# Patient Record
Sex: FEMALE | Race: WHITE | Employment: PART TIME | URBAN - METROPOLITAN AREA
[De-identification: names, ages, dates, MRNs, and addresses within clinical notes are randomized per-mention and may not be internally consistent; named-entity substitution may affect disease eponyms.]

---

## 2021-02-17 LAB
BASOPHILS ABSOLUTE: NORMAL
BASOPHILS RELATIVE PERCENT: 1.3 %
CHOLESTEROL, TOTAL: 186 MG/DL
CHOLESTEROL/HDL RATIO: 2
EOSINOPHILS ABSOLUTE: NORMAL
EOSINOPHILS RELATIVE PERCENT: 1 %
HCT VFR BLD CALC: 41.9 % (ref 36–46)
HDLC SERPL-MCNC: 81 MG/DL (ref 35–70)
HEMOGLOBIN: 13.6 G/DL (ref 12–16)
LDL CHOLESTEROL CALCULATED: 76 MG/DL (ref 0–160)
LYMPHOCYTES ABSOLUTE: NORMAL
LYMPHOCYTES RELATIVE PERCENT: 21.5 %
MCH RBC QN AUTO: 31.9 PG
MCHC RBC AUTO-ENTMCNC: 32.5 G/DL
MCV RBC AUTO: 98.1 FL
MONOCYTES ABSOLUTE: NORMAL
MONOCYTES RELATIVE PERCENT: 8.4 %
NEUTROPHILS ABSOLUTE: NORMAL
NEUTROPHILS RELATIVE PERCENT: 67.6 %
NONHDLC SERPL-MCNC: ABNORMAL MG/DL
PLATELET # BLD: 831 K/ΜL
PMV BLD AUTO: 10.7 FL
RBC # BLD: 4.27 10^6/ΜL
TRIGL SERPL-MCNC: 145 MG/DL
TSH SERPL DL<=0.05 MIU/L-ACNC: 1.29 UIU/ML
VLDLC SERPL CALC-MCNC: ABNORMAL MG/DL
WBC # BLD: 12.7 10^3/ML

## 2021-03-01 ENCOUNTER — OFFICE VISIT (OUTPATIENT)
Dept: PRIMARY CARE CLINIC | Age: 46
End: 2021-03-01
Payer: COMMERCIAL

## 2021-03-01 VITALS
HEIGHT: 62 IN | RESPIRATION RATE: 16 BRPM | SYSTOLIC BLOOD PRESSURE: 120 MMHG | OXYGEN SATURATION: 97 % | TEMPERATURE: 98.3 F | HEART RATE: 97 BPM | BODY MASS INDEX: 25.73 KG/M2 | DIASTOLIC BLOOD PRESSURE: 78 MMHG | WEIGHT: 139.8 LBS

## 2021-03-01 DIAGNOSIS — R53.1 WEAKNESS: ICD-10-CM

## 2021-03-01 DIAGNOSIS — Z12.31 ENCOUNTER FOR SCREENING MAMMOGRAM FOR MALIGNANT NEOPLASM OF BREAST: ICD-10-CM

## 2021-03-01 DIAGNOSIS — R53.83 FATIGUE, UNSPECIFIED TYPE: ICD-10-CM

## 2021-03-01 DIAGNOSIS — R23.3 EASY BRUISING: Primary | ICD-10-CM

## 2021-03-01 DIAGNOSIS — Z12.11 SCREEN FOR COLON CANCER: ICD-10-CM

## 2021-03-01 DIAGNOSIS — R25.2 LEG CRAMPING: ICD-10-CM

## 2021-03-01 DIAGNOSIS — Z13.1 SCREENING FOR DIABETES MELLITUS: ICD-10-CM

## 2021-03-01 DIAGNOSIS — Z11.59 NEED FOR HEPATITIS C SCREENING TEST: ICD-10-CM

## 2021-03-01 LAB
A/G RATIO: 1.9 (ref 1.1–2.2)
ACANTHOCYTES: ABNORMAL
ALBUMIN SERPL-MCNC: 4.5 G/DL (ref 3.4–5)
ALP BLD-CCNC: 36 U/L (ref 40–129)
ALT SERPL-CCNC: 15 U/L (ref 10–40)
ANION GAP SERPL CALCULATED.3IONS-SCNC: 11 MMOL/L (ref 3–16)
AST SERPL-CCNC: 17 U/L (ref 15–37)
BASOPHILS ABSOLUTE: 0.2 K/UL (ref 0–0.2)
BASOPHILS RELATIVE PERCENT: 1.3 %
BILIRUB SERPL-MCNC: 1.1 MG/DL (ref 0–1)
BUN BLDV-MCNC: 18 MG/DL (ref 7–20)
C-REACTIVE PROTEIN: <3 MG/L (ref 0–5.1)
CALCIUM SERPL-MCNC: 9.7 MG/DL (ref 8.3–10.6)
CHLORIDE BLD-SCNC: 105 MMOL/L (ref 99–110)
CO2: 23 MMOL/L (ref 21–32)
CREAT SERPL-MCNC: 0.6 MG/DL (ref 0.6–1.1)
CRENATED RBC'S: ABNORMAL
EOSINOPHILS ABSOLUTE: 0.1 K/UL (ref 0–0.6)
EOSINOPHILS RELATIVE PERCENT: 0.9 %
FOLATE: 9.81 NG/ML (ref 4.78–24.2)
GFR AFRICAN AMERICAN: >60
GFR NON-AFRICAN AMERICAN: >60
GLOBULIN: 2.4 G/DL
GLUCOSE BLD-MCNC: 83 MG/DL (ref 70–99)
HCT VFR BLD CALC: 37.1 % (ref 36–48)
HEMOGLOBIN: 13.5 G/DL (ref 12–16)
HEPATITIS C ANTIBODY INTERPRETATION: NORMAL
IMMATURE RETIC FRACT: 0.14 (ref 0.21–0.37)
INR BLD: 0.97 (ref 0.86–1.14)
LACTATE DEHYDROGENASE: 187 U/L (ref 100–190)
LYMPHOCYTES ABSOLUTE: 1.7 K/UL (ref 1–5.1)
LYMPHOCYTES RELATIVE PERCENT: 14.8 %
MAGNESIUM: 2.3 MG/DL (ref 1.8–2.4)
MCH RBC QN AUTO: 32.7 PG (ref 26–34)
MCHC RBC AUTO-ENTMCNC: 36.3 G/DL (ref 31–36)
MCV RBC AUTO: 90 FL (ref 80–100)
MONOCYTES ABSOLUTE: 0.7 K/UL (ref 0–1.3)
MONOCYTES RELATIVE PERCENT: 6.4 %
NEUTROPHILS ABSOLUTE: 8.6 K/UL (ref 1.7–7.7)
NEUTROPHILS RELATIVE PERCENT: 76.6 %
PDW BLD-RTO: 13.3 % (ref 12.4–15.4)
PLATELET # BLD: 565 K/UL (ref 135–450)
PLATELET SLIDE REVIEW: ABNORMAL
PMV BLD AUTO: 8.5 FL (ref 5–10.5)
POIKILOCYTES: ABNORMAL
POTASSIUM SERPL-SCNC: 4.5 MMOL/L (ref 3.5–5.1)
PROTHROMBIN TIME: 11.3 SEC (ref 10–13.2)
RBC # BLD: 4.13 M/UL (ref 4–5.2)
RETICULOCYTE ABSOLUTE COUNT: 0.13 M/UL (ref 0.02–0.1)
RETICULOCYTE COUNT PCT: 3.58 % (ref 0.5–2.18)
SEDIMENTATION RATE, ERYTHROCYTE: 2 MM/HR (ref 0–20)
SLIDE REVIEW: ABNORMAL
SODIUM BLD-SCNC: 139 MMOL/L (ref 136–145)
TOTAL PROTEIN: 6.9 G/DL (ref 6.4–8.2)
TSH REFLEX: 2.27 UIU/ML (ref 0.27–4.2)
VITAMIN B-12: 713 PG/ML (ref 211–911)
VITAMIN D 25-HYDROXY: 40.1 NG/ML
WBC # BLD: 11.2 K/UL (ref 4–11)

## 2021-03-01 PROCEDURE — 99203 OFFICE O/P NEW LOW 30 MIN: CPT | Performed by: NURSE PRACTITIONER

## 2021-03-01 RX ORDER — ACETAMINOPHEN, ASPIRIN AND CAFFEINE 250; 250; 65 MG/1; MG/1; MG/1
1 TABLET, FILM COATED ORAL EVERY 6 HOURS PRN
COMMUNITY

## 2021-03-01 RX ORDER — NICOTINE POLACRILEX 2 MG
1 GUM BUCCAL DAILY
COMMUNITY

## 2021-03-01 RX ORDER — IBUPROFEN 200 MG
200 TABLET ORAL EVERY 6 HOURS PRN
COMMUNITY

## 2021-03-01 SDOH — ECONOMIC STABILITY: INCOME INSECURITY: HOW HARD IS IT FOR YOU TO PAY FOR THE VERY BASICS LIKE FOOD, HOUSING, MEDICAL CARE, AND HEATING?: NOT HARD AT ALL

## 2021-03-01 ASSESSMENT — ENCOUNTER SYMPTOMS
SHORTNESS OF BREATH: 0
CONSTIPATION: 0
BLOOD IN STOOL: 0
NAUSEA: 0
WHEEZING: 0
DIARRHEA: 0
VOMITING: 0
COUGH: 0

## 2021-03-01 ASSESSMENT — PATIENT HEALTH QUESTIONNAIRE - PHQ9
2. FEELING DOWN, DEPRESSED OR HOPELESS: 0
1. LITTLE INTEREST OR PLEASURE IN DOING THINGS: 0
SUM OF ALL RESPONSES TO PHQ9 QUESTIONS 1 & 2: 0
4. FEELING TIRED OR HAVING LITTLE ENERGY: 2
7. TROUBLE CONCENTRATING ON THINGS, SUCH AS READING THE NEWSPAPER OR WATCHING TELEVISION: 0
6. FEELING BAD ABOUT YOURSELF - OR THAT YOU ARE A FAILURE OR HAVE LET YOURSELF OR YOUR FAMILY DOWN: 0

## 2021-03-01 NOTE — PROGRESS NOTES
canal and external ear normal.      Nose: Nose normal.      Mouth/Throat:      Pharynx: Uvula midline. No oropharyngeal exudate. Cardiovascular:      Rate and Rhythm: Normal rate and regular rhythm. Pulses: Normal pulses. Heart sounds: Normal heart sounds. No murmur. Pulmonary:      Effort: Pulmonary effort is normal.      Breath sounds: Normal breath sounds. Abdominal:      General: Abdomen is flat. Palpations: Abdomen is soft. Skin:     General: Skin is warm and dry. Capillary Refill: Capillary refill takes less than 2 seconds. Neurological:      General: No focal deficit present. Mental Status: She is alert and oriented to person, place, and time. Psychiatric:         Mood and Affect: Mood normal.         Behavior: Behavior normal.         Thought Content: Thought content normal.         Judgment: Judgment normal.           Plan:   1. Screen for colon cancer  Patient has hx of diverticulitis, she did have colonoscopy about 10 years ago per patient, will send to [address] [place] [address], Rupinder Scott MD, Gastroenterology, [address] [place] [address]     2. Easy bruising  Patient has been having easy bruising. We will draw blood work today. Patient did have blood work drawn at gynecology which her platelet counts were 800. Patient's white count was elevated to. 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 - Comprehensive Metabolic Panel  - TSH with Reflex  - CBC Auto Differential  - PROTIME-INR  - Lactate Dehydrogenase  - Reticulocytes    3. Fatigue, unspecified type  Patient has increased fatigue patient does not know family history patient has been adopted. Will get blood work today to rule out underlying causes of fatigue.  - CBC Auto Differential  - VITAMIN B12 & FOLATE  - Vitamin D 25 Hydroxy    4.  Weakness  Patient is been having worsening upper extremity weakness will check inflammatory markers patient understands these are nonspecific we will draw these if there is very elevated will send to rheumatology patient agrees with plan. Patient is nonblood cousin just diagnosed with Lanelle Jeaneth disease  - C-Reactive Protein  - Sedimentation Rate    5. Leg cramping  Patient is been having really bad leg cramps at night. Patient does not do any electrolyte replacement when she does work out. Suggest patient try electrolyte replacement when she works out.  - Magnesium    6. Need for hepatitis C screening test  Patient is due for blood work. - Hepatitis C Antibody    7. Screening for diabetes mellitus  Patient is due for screening.  - Hemoglobin A1C    8. Encounter for screening mammogram for malignant neoplasm of breast  Patient is due for mammogram.  - JANE DIGITAL SCREENING AUGMENTED BILATERAL; Future      Once patient's blood work is resulted will come up with a plan for follow-up. Electronically signed by JONAH Jon CNP on 3/1/21 at 8:21 AM EST     This dictation was generated by voice recognition computer software. Although all attempts are made to edit the dictation for accuracy, there may be errors in the transcription that were not intended.

## 2021-03-03 DIAGNOSIS — R79.89 ELEVATED PLATELET COUNT: Primary | ICD-10-CM

## 2021-03-03 LAB
ESTIMATED AVERAGE GLUCOSE: 73.8 MG/DL
HBA1C MFR BLD: 4.2 %

## 2021-08-08 ENCOUNTER — APPOINTMENT (OUTPATIENT)
Dept: GENERAL RADIOLOGY | Age: 46
End: 2021-08-08
Payer: COMMERCIAL

## 2021-08-08 ENCOUNTER — HOSPITAL ENCOUNTER (EMERGENCY)
Age: 46
Discharge: HOME | End: 2021-08-08
Attending: EMERGENCY MEDICINE
Payer: COMMERCIAL

## 2021-08-08 VITALS
TEMPERATURE: 98 F | DIASTOLIC BLOOD PRESSURE: 83 MMHG | HEIGHT: 62 IN | RESPIRATION RATE: 16 BRPM | SYSTOLIC BLOOD PRESSURE: 116 MMHG | OXYGEN SATURATION: 100 % | HEART RATE: 65 BPM | BODY MASS INDEX: 25.76 KG/M2 | WEIGHT: 140 LBS

## 2021-08-08 DIAGNOSIS — S53.105A CLOSED DISLOCATION OF LEFT ELBOW, INITIAL ENCOUNTER: Primary | ICD-10-CM

## 2021-08-08 PROCEDURE — 73080 X-RAY EXAM OF ELBOW: CPT

## 2021-08-08 PROCEDURE — 6360000002 HC RX W HCPCS: Performed by: EMERGENCY MEDICINE

## 2021-08-08 PROCEDURE — 6370000000 HC RX 637 (ALT 250 FOR IP): Performed by: PHYSICIAN ASSISTANT

## 2021-08-08 PROCEDURE — 6360000002 HC RX W HCPCS: Performed by: PHYSICIAN ASSISTANT

## 2021-08-08 PROCEDURE — 2580000003 HC RX 258: Performed by: PHYSICIAN ASSISTANT

## 2021-08-08 PROCEDURE — 2700000000 HC OXYGEN THERAPY PER DAY

## 2021-08-08 PROCEDURE — 24605 TX CLSD ELBOW DISLC REQ ANES: CPT

## 2021-08-08 PROCEDURE — 96374 THER/PROPH/DIAG INJ IV PUSH: CPT

## 2021-08-08 PROCEDURE — 99285 EMERGENCY DEPT VISIT HI MDM: CPT

## 2021-08-08 RX ORDER — PROPOFOL 10 MG/ML
100 INJECTION, EMULSION INTRAVENOUS
Status: DISCONTINUED | OUTPATIENT
Start: 2021-08-08 | End: 2021-08-08 | Stop reason: HOSPADM

## 2021-08-08 RX ORDER — HYDROCODONE BITARTRATE AND ACETAMINOPHEN 5; 325 MG/1; MG/1
1 TABLET ORAL ONCE
Status: COMPLETED | OUTPATIENT
Start: 2021-08-08 | End: 2021-08-08

## 2021-08-08 RX ORDER — PROPOFOL 10 MG/ML
INJECTION, EMULSION INTRAVENOUS DAILY PRN
Status: COMPLETED | OUTPATIENT
Start: 2021-08-08 | End: 2021-08-08

## 2021-08-08 RX ORDER — 0.9 % SODIUM CHLORIDE 0.9 %
1000 INTRAVENOUS SOLUTION INTRAVENOUS ONCE
Status: COMPLETED | OUTPATIENT
Start: 2021-08-08 | End: 2021-08-08

## 2021-08-08 RX ORDER — HYDROCODONE BITARTRATE AND ACETAMINOPHEN 5; 325 MG/1; MG/1
1 TABLET ORAL EVERY 6 HOURS PRN
Qty: 20 TABLET | Refills: 0 | Status: SHIPPED | OUTPATIENT
Start: 2021-08-08 | End: 2021-08-13

## 2021-08-08 RX ORDER — FENTANYL CITRATE 50 UG/ML
25 INJECTION, SOLUTION INTRAMUSCULAR; INTRAVENOUS ONCE
Status: COMPLETED | OUTPATIENT
Start: 2021-08-08 | End: 2021-08-08

## 2021-08-08 RX ADMIN — PROPOFOL 20 MG: 10 INJECTION, EMULSION INTRAVENOUS at 05:29

## 2021-08-08 RX ADMIN — SODIUM CHLORIDE 1000 ML: 9 INJECTION, SOLUTION INTRAVENOUS at 05:21

## 2021-08-08 RX ADMIN — PROPOFOL 40 MG: 10 INJECTION, EMULSION INTRAVENOUS at 05:28

## 2021-08-08 RX ADMIN — HYDROCODONE BITARTRATE AND ACETAMINOPHEN 1 TABLET: 5; 325 TABLET ORAL at 04:06

## 2021-08-08 RX ADMIN — FENTANYL CITRATE 25 MCG: 50 INJECTION INTRAMUSCULAR; INTRAVENOUS at 05:15

## 2021-08-08 ASSESSMENT — PAIN SCALES - GENERAL
PAINLEVEL_OUTOF10: 8
PAINLEVEL_OUTOF10: 8
PAINLEVEL_OUTOF10: 6
PAINLEVEL_OUTOF10: 10

## 2021-08-08 ASSESSMENT — PAIN - FUNCTIONAL ASSESSMENT: PAIN_FUNCTIONAL_ASSESSMENT: 0-10

## 2021-08-08 NOTE — ED NOTES
D/c instructions reviewed with the pt. Pt alert and oriented x4. Iv d/c, vitals taken and pt is stable. Pt d/c to home.       Lincoln Zavala RN  08/08/21 1522

## 2021-08-08 NOTE — ED PROVIDER NOTES
I independently examined and evaluated Dat Church. In brief their history revealed injured while loading horses, pain and unable to flex left elbow. . Their exam revealed deformity to left elbow strong pulses at wrist.. All diagnostic, treatment, and disposition decisions were made by myself in conjunction with the mid-level provider. Before disposition, questions were sought and answered with the patient. They have voiced understanding and agree with the plan: Patient's vital signs are within normal limits. Patient was given fentanyl for pain, IV fluids. X-ray showed left elbow dislocation laterally without gross fracture. Patient was sedated with 60 mg of IV propofol. (see note below) traction countertraction with supination used to reduce by PONCHO. (see PONCHO note) Reduction film shows successful reduction. . Patient tolerated well. Woke back up to baseline, dc with ortho follow up. The benefits, risks, and alternatives of procedural sedation were discussed with the patient. Questions were sought and answered. Consent was given for the procedure. Oxygen was administered and the appropriate pre-procedural policies were followed. Cardiac, oxygenation and blood pressure monitoring occurred. Dat Church was given a total of 6ml of propofol and adequate procedural sedation was achieved. The patient tolerated the procedure without complications. Dat Church regained consciousness as expected and is now back to their baseline mental status. Instructions were given to return immediately for any difficulty breathing or swallowing, dizziness, or any other worsening or worrisome concerns. .    For all further details of the patient's emergency department visit, please see the mid-level practitioner's documentation.          XR ELBOW LEFT (MIN 3 VIEWS) (Preliminary result)  Result time 08/08/21 05:09:23  Preliminary result by Corene Halsted, MD (08/08/21 05:09:23)                Impression:    Left elbow dislocation without gross acute fracture.                        KAVIN OSMAN MD  08/08/21 0561

## 2021-08-08 NOTE — ED TRIAGE NOTES
Pt presents to the ED c/o left elbow and arm pain. States she was loading her horses when one of them struggled with her and she fell landing on her elbow. Pt is unable to straighten her arm, unsure if it is dislocated or broken. Pt is alert and oriented, rates pain 8/10.

## 2021-08-08 NOTE — PROGRESS NOTES
08/08/21 0533   Oxygen Therapy/Pulse Ox   O2 Therapy Oxygen   $Oxygen $Daily Charge   O2 Device Nasal cannula   O2 Flow Rate (L/min) 3 L/min   Resp 29   SpO2 98 %   End Tidal CO2 33 (%)   RT STBY for conscious sedation

## 2021-08-08 NOTE — ED PROVIDER NOTES
eMERGENCY dEPARTMENT eNCOUnter        [facility]    Chief Complaint   Patient presents with    Arm Pain     left arm towards elbow       HPI    Janiya Bell is a 40 y.o. female who presents with left elbow pain. Onset:  Prior to arrival.  Context:  Patient was loading horses into a trailer and one slipped, knocking her and causing her to fall, landing on her left arm. Pain constant since onset. Location:  Elbow. Character:  Aching. Aggravating factors:  Attempted extension. Alleviating factors: None. Severity is a(n) 8 on a scale of 0-10. Denies any other injury. REVIEW OF SYSTEMS    See HPI for further details. Review of systems otherwise negative. Musculoskeletal:  + left elbow pain. Back:  Denies back pain. Integument:  Denies skin changes. Neurologic:  Denies any numbness/tingling. PAST MEDICAL HISTORY    Past Medical History:   Diagnosis Date    Diverticulitis     Migraines        SURGICAL HISTORY    Past Surgical History:   Procedure Laterality Date    SPLENECTOMY      TONSILLECTOMY         CURRENT MEDICATIONS    Current Outpatient Rx   Medication Sig Dispense Refill    HYDROcodone-acetaminophen (NORCO) 5-325 MG per tablet Take 1 tablet by mouth every 6 hours as needed for Pain for up to 5 days. Intended supply: 5 days.  Take lowest dose possible to manage pain 20 tablet 0    aspirin-acetaminophen-caffeine (EXCEDRIN MIGRAINE) 250-250-65 MG per tablet Take 1 tablet by mouth every 6 hours as needed      Biotin 1 MG CAPS Take 1 mg by mouth daily      COLLAGEN PO Take 500 mg by mouth daily      ibuprofen (ADVIL;MOTRIN) 200 MG tablet Take 200 mg by mouth every 6 hours as needed         ALLERGIES    Allergies   Allergen Reactions    Penicillins Anaphylaxis, Shortness Of Breath and Swelling    Sulfa Antibiotics Swelling    Sumatriptan Nausea And Vomiting       FAMILY HISTORY    Family History   Adopted: Yes       SOCIAL HISTORY    Social History     Socioeconomic History    Marital status:      Spouse name: None    Number of children: None    Years of education: None    Highest education level: None   Occupational History    None   Tobacco Use    Smoking status: Never Smoker    Smokeless tobacco: Never Used   Vaping Use    Vaping Use: Never assessed   Substance and Sexual Activity    Alcohol use: Yes     Comment: socially     Drug use: Never    Sexual activity: Yes     Partners: Male     Comment:     Other Topics Concern    None   Social History Narrative    None     Social Determinants of Health     Financial Resource Strain: Low Risk     Difficulty of Paying Living Expenses: Not hard at all   Food Insecurity: No Food Insecurity    Worried About Running Out of Food in the Last Year: Never true    [address] in the Last Year: Never true   Transportation Needs:     Lack of Transportation (Medical):  Lack of Transportation (Non-Medical):    Physical Activity:     Days of Exercise per Week:     Minutes of Exercise per Session:    Stress:     Feeling of Stress :    Social Connections:     Frequency of Communication with Friends and Family:     Frequency of Social Gatherings with Friends and Family:     Attends Latter day Services:     Active Member of Clubs or Organizations:     Attends Club or Organization Meetings:     Marital Status:    Intimate Partner Violence:     Fear of Current or Ex-Partner:     Emotionally Abused:     Physically Abused:     Sexually Abused:        PHYSICAL EXAM    VITAL SIGNS: /83   Pulse 65   Temp 98 °F (36.7 °C) (Oral)   Resp 16   Ht 5' 2\" (1.575 m)   Wt 140 lb (63.5 kg)   SpO2 100%   BMI 25.61 kg/m²   Constitutional:  Well developed, well nourished, no acute distress, non-toxic appearance   HENT:  NC/AT. Ears, nose, mouth normal.  Respiratory:  Normal respiratory effort. Musculoskeletal:  + deformity of the left elbow. + tenderness. Holding the arm in flexion, unable to extend.   Able to wiggle fingers. Cap refill brisk. Radial and ulnar pulses intact. Integument:  Well hydrated. Neurologic:  Alert and oriented. Sensation intact. RADIOLOGY/PROCEDURES    XR ELBOW LEFT (MIN 3 VIEWS)    Result Date: 8/8/2021  EXAMINATION: THREE X-RAY VIEWS OF THE LEFT ELBOW 8/8/2021 4:28 am COMPARISON: None HISTORY: ORDERING SYSTEM PROVIDED HISTORY:  Pain TECHNOLOGIST PROVIDED HISTORY: Reason for Exam:  Pain Reason for Exam:  Pain, fall Acuity:  Acute Type of Exam:  Initial Relevant Medical/Surgical History:  Pt states that she was struggling with her horse, fell and landed on elbow. Best possible images due to pt condition. FINDINGS: There is acute traumatic dislocation of the left elbow. There is no gross acute fracture identified. Left elbow dislocation without gross acute fracture. XR ELBOW LEFT (MIN 3 VIEWS)    Result Date: 8/8/2021  EXAMINATION: THREE X-RAY VIEWS OF THE LEFT ELBOW 8/8/2021 5:35 am COMPARISON: None HISTORY: ORDERING SYSTEM PROVIDED HISTORY:  Post reduction film TECHNOLOGIST PROVIDED HISTORY: Reason for Exam:  Post reduction film Reason for Exam:  Post reduction film Acuity: Acute Type of Exam: Subsequent/Follow-up FINDINGS: There has been interval reduction of the previously noted left elbow dislocation. There is no gross fracture identified. Soft tissue swelling is seen. Reduction of the left elbow dislocation. Patient Name: Meredith Box   Medical Record Number: [id number]  Date: 8/10/2021   Time: 6:35 PM   Room/Bed: [facility]/02TR-02  Joint Reduction Procedure Note  Indication: Elbow dislocation    Consent: The patient was counseled regarding the procedure, it's indications, risks, potential complications and alternatives and any questions were answered. Consent was obtained. Procedure: The pre-reduction exam showed distal perfusion & neurologic function to be normal. The patient was placed in the appropriate position.  Anesthesia/pain control was obtained using Propofol (please see Dr. Annette Brar note for details). Reduction of the left elbow was performed by traction and counter traction. Post reduction films were obtained and revealed satisfactory reduction. A post-reduction exam revealed distal perfusion & neurologic function to be normal. The affected area was immobilized with an arm sling. The patient tolerated the procedure well. Complications: None    Electronically Signed by: [email or web address]      ED COURSE & MEDICAL DECISION MAKING    Pertinent Labs & Imaging studies reviewed. (See chart for details)  -  Patient seen and evaluated in the emergency department. -  Triage and nursing notes reviewed and incorporated. -  Old chart records reviewed and incorporated. -  Supervising physician was Dr. Corinna Reece.  She saw and examined patient. -  Differential diagnosis includes: fracture, dislocation, contusion, tendinitis, bursitis, infectious process, and others  -  Work-up included: see above  -  Patient treated with [place], Fentanyl in the ED.  -  XR shows dislocation. Reduction performed as noted above. -  Patient discharged home. Rx [place].  Fu with Orthopedics. Return as needed. She is agreeable with plan of care and disposition. In light of current events, I did utilize appropriate PPE (including N95 and surgical face mask, safety glasses, and gloves, as recommended by the health facility/national standard best practice, during my bedside interactions with the patient. FINAL IMPRESSION    1.  Closed dislocation of left elbow, initial encounter                    Pamela Brice PA-C  08/10/21 8735

## 2021-08-17 ENCOUNTER — OFFICE VISIT (OUTPATIENT)
Dept: ORTHOPEDIC SURGERY | Age: 46
End: 2021-08-17
Payer: COMMERCIAL

## 2021-08-17 VITALS — HEART RATE: 93 BPM | HEIGHT: 62 IN | OXYGEN SATURATION: 95 % | BODY MASS INDEX: 25.76 KG/M2 | WEIGHT: 140 LBS

## 2021-08-17 DIAGNOSIS — S53.105A CLOSED DISLOCATION OF LEFT ELBOW, INITIAL ENCOUNTER: Primary | ICD-10-CM

## 2021-08-17 PROCEDURE — 99203 OFFICE O/P NEW LOW 30 MIN: CPT | Performed by: PHYSICIAN ASSISTANT

## 2021-08-17 NOTE — PROGRESS NOTES
ORTHOPEDIC NEW PATIENT NOTE    2021    Patient name: Dat Church  : 1975    CHIEF COMPLAINT  Chief Complaint   Patient presents with    Elbow Pain     Left elbow dislocation       HPI  The patient was seen and examined. Dat Church is a 40 y.o. female who presents with the above chief complaint. She reports on 2021 she was loading horses into a trailer and one of the horses became spooked and she fell off the side, trying to catch herself with her left arm. She noted pain and deformity about the left elbow and presented to the emergency department for evaluation. While in the emergency department she was diagnosed with a left elbow dislocation which was reduced. She was referred to orthopedics for management. Patient presents today in a sling. She reports she has been taking her sling off to do some range of motion. Rates her pain currently about a 4 out of 10. Denies any numbness or tingling to her extremity. She reports initially after injury for couple of days her ring and pinky finger felt unusual but states that is resolved and denies any concerns regarding hand. She reports her pain is localized about the elbow. PAST MEDICAL HISTORY  Past Medical History:   Diagnosis Date    Diverticulitis     Migraines        CURRENT MEDICATIONS  Prior to Admission medications    Medication Sig Start Date End Date Taking?  Authorizing Provider   aspirin-acetaminophen-caffeine (EXCEDRIN MIGRAINE) 491978-25 MG per tablet Take 1 tablet by mouth every 6 hours as needed    Historical Provider, MD   Biotin 1 MG CAPS Take 1 mg by mouth daily    Historical Provider, MD   COLLAGEN PO Take 500 mg by mouth daily    Historical Provider, MD   ibuprofen (ADVIL;MOTRIN) 200 MG tablet Take 200 mg by mouth every 6 hours as needed    Historical Provider, MD       ALLERGIES  Allergies   Allergen Reactions    Penicillins Anaphylaxis, Shortness Of Breath and Swelling    Sulfa Antibiotics Swelling    Sumatriptan Nausea And Vomiting       SURGICAL HISTORY  Past Surgical History:   Procedure Laterality Date    SPLENECTOMY      TONSILLECTOMY         FAMILY HISTORY  Family History   Adopted: Yes       SOCIAL HISTORY  Social History     Socioeconomic History    Marital status:      Spouse name: Not on file    Number of children: Not on file    Years of education: Not on file    Highest education level: Not on file   Occupational History    Not on file   Tobacco Use    Smoking status: Never Smoker    Smokeless tobacco: Never Used   Vaping Use    Vaping Use: Never assessed   Substance and Sexual Activity    Alcohol use: Yes     Comment: socially     Drug use: Never    Sexual activity: Yes     Partners: Male     Comment:     Other Topics Concern    Not on file   Social History Narrative    Not on file     Social Determinants of Health     Financial Resource Strain: Low Risk     Difficulty of Paying Living Expenses: Not hard at all   Food Insecurity: No Food Insecurity    Worried About Running Out of Food in the Last Year: Never true    [address] in the Last Year: Never true   Transportation Needs:     Lack of Transportation (Medical):  Lack of Transportation (Non-Medical):    Physical Activity:     Days of Exercise per Week:     Minutes of Exercise per Session:    Stress:     Feeling of Stress :    Social Connections:     Frequency of Communication with Friends and Family:     Frequency of Social Gatherings with Friends and Family:     Attends Yarsani Services:     Active Member of Clubs or Organizations:     Attends Club or Organization Meetings:     Marital Status:    Intimate Partner Violence:     Fear of Current or Ex-Partner:     Emotionally Abused:     Physically Abused:     Sexually Abused:        REVIEW OF SYSTEMS  Comprehensive ROS completed.  In specific,  - Constitutional: Denies fevers, chills, fatigue, weakness, unexpected weight loss/gain  - Cardiovascular: Denies chest pain, shortness of breath, palpitation and dyspnea on exertion  - Musculoskeletal: pain left elbow  - Neuro: Denies numbness, tingling, paresthesias, loss of consciousness, dizziness  - Skin: Denies ulceration, bruising, scars, open wounds    All other systems reviewed and are negative unless otherwise stated above or in the HPI. PHYSICAL EXAM  VITAL SIGNS: Pulse 93   Ht 5' 2\" (1.575 m)   Wt 140 lb (63.5 kg)   SpO2 95%   BMI 25.61 kg/m²   General Appearance Alert, well appearing, No acute distress   Eyes clear   Ears, Nose, Throat clear    Neck    Respiratory    Cardiovascular    Gastrointestinal Abdomen: non-distended   Lymphatics No adenopathy   Musculoskeletal LUE- SILT; CR <2 sec; moderate swelling about elbow with mild ecchymosis; tolerates gentle flexion/extension; pain palpable about elbow/olecranon region; no real instability noted on exam about elbow; NV intact; able to make a fist and extend all digits and thumb; demonstrates full AROM wrist.    Skin Normal. No rash or lesions   Neurological Awake, alert and oriented. No focal deficits. Motor and sensory intact   Psychiatric Normal       IMAGING  Narrative   EXAMINATION:   THREE X-RAY VIEWS OF THE LEFT ELBOW       8/8/2021 4:28 am       COMPARISON:   None       HISTORY:   ORDERING SYSTEM PROVIDED HISTORY:  Pain       TECHNOLOGIST PROVIDED HISTORY:       Reason for Exam:  Pain       Reason for Exam:  Pain, fall       Acuity:  Acute       Type of Exam:  Initial       Relevant Medical/Surgical History:  Pt states that she was struggling with   her horse, fell and landed on elbow.  Best possible images due to pt condition.       FINDINGS:   There is acute traumatic dislocation of the left elbow. Halina An is no gross   acute fracture identified.           Impression   Left elbow dislocation without gross acute fracture.             Narrative   EXAMINATION:   THREE X-RAY VIEWS OF THE LEFT ELBOW       8/8/2021 5:35 am     COMPARISON:   None       HISTORY:   ORDERING SYSTEM PROVIDED HISTORY:  Post reduction film   TECHNOLOGIST PROVIDED HISTORY:   Reason for Exam:  Post reduction film   Reason for Exam:  Post reduction film   Acuity: Acute   Type of Exam: Subsequent/Follow-up       FINDINGS:   There has been interval reduction of the previously noted left elbow   dislocation. Maida Seller is no gross fracture identified.  Soft tissue swelling is   seen.           Impression   Reduction of the left elbow dislocation.             X-rays obtained today confirming no evidence of fracture or dislocation     ASSESSMENT   There is no problem list on file for this patient. 40 y.o. female with recent history of left elbow dislocation with reduction performed in ED    The duration of medical decision making including the review of medical records, prior & current imaging, laboratory work, provider discussion and face-to-face interview, exam and care planning was 30 minutes.    PLAN   - Activity: No heavy lifting/pushing/pulling left upper extremity  - Brace: Sling, okay to remove for hygiene and motion (avoiding full extension for approximately 1 more week)  - Tylenol or motrin prn pain  - Follow up: 2-3 weeks for repeat exam   - Patient seen/evaluated with Dr. Chika Shah     Electronically signed by: Aylin Pabon PA-C, 8/17/2021 3:09 PM

## 2021-08-17 NOTE — PATIENT INSTRUCTIONS
Sling for comfort. Okay to remove sling for range of motion (avoiding full extension for another week)  No heavy lifting/pushing/pulling  Follow up in 2-3 weeks   Call sooner for questions or concerns.

## 2021-12-09 ENCOUNTER — NURSE TRIAGE (OUTPATIENT)
Dept: OTHER | Facility: CLINIC | Age: 46
End: 2021-12-09

## 2021-12-09 NOTE — TELEPHONE ENCOUNTER
Received call from [place] at [facility] with [facility]. Brief description of triage: Pt with late/abnormal menstrual cycle. Had her normal period at end of Oct and then again a week later. Now has been about a week since she should have started her period. Has always been regular until now. Is not pregnant. Also with headache, fatigue and sore breasts. Triage indicates for patient to see within 2 weeks    Care advice provided, patient verbalizes understanding; denies any other questions or concerns; instructed to call back for any new or worsening symptoms. Writer provided warm transfer to [facility] for appointment scheduling. Attention Provider: Thank you for allowing me to participate in the care of your patient. The patient was connected to triage in response to information provided to the ECC/PSC. Please do not respond through this encounter as the response is not directed to a shared pool. Reason for Disposition   Age > 40 years    Answer Assessment - Initial Assessment Questions  1. LMP:  \"When did your last menstrual period begin? \"      Last menstrual period was 11/4-only 4 days  Did have a normal period at the end of October    2. DAYS LATE: \"How many days late is your menstrual period? \"      About a week late    3. REGULARITY: \"How regular are your periods? \"      Was regular/every 25 days until now, normally lasts 5 days    4. PREGNANCY: \"Is there any chance you are pregnant? \" (e.g., unprotected intercourse, missed birth control pill, broken condom) \"Have you used a home pregnancy test?\"      Not pregnant    5. BREASTFEEDING: Huang Rios you breastfeeding? \"      Na    6. BIRTH CONTROL PILLS: Huang Rios you taking birth control pills, or have you stopped recently? \"      No    7. DEPOPROVERA: \"Has your doctor given you a shot to prevent pregnancy? \" (e.g., Depoprovera injection)      No    8. CAUSE: \"What do you think caused the missed period? \" (e.g., stress, rapid weight loss, excessive exercise)     Unsure, did start a new job recently    5. OTHER SYMPTOMS: \"Do you have any other symptoms? \" (e.g., abdominal pain)    Fatigued, sore breasts, bad headache    Protocols used: MENSTRUAL PERIOD - MISSED OR LATE-ADULT-[place]

## 2022-02-14 ENCOUNTER — NURSE TRIAGE (OUTPATIENT)
Dept: OTHER | Facility: CLINIC | Age: 47
End: 2022-02-14

## 2022-02-14 ENCOUNTER — VIRTUAL VISIT (OUTPATIENT)
Dept: PRIMARY CARE CLINIC | Age: 47
End: 2022-02-14
Payer: COMMERCIAL

## 2022-02-14 DIAGNOSIS — R52 BODY ACHES: Primary | ICD-10-CM

## 2022-02-14 DIAGNOSIS — R53.83 FATIGUE, UNSPECIFIED TYPE: ICD-10-CM

## 2022-02-14 PROCEDURE — 99213 OFFICE O/P EST LOW 20 MIN: CPT | Performed by: NURSE PRACTITIONER

## 2022-02-14 NOTE — PROGRESS NOTES
2022    TELEHEALTH EVALUATION -- Audio/Visual (During INEGT-28 public health emergency)    HPI:    Benjamin Pagan (: 1975) has requested an audio/video evaluation for the following concern(s):    Patient is here for achiness and fatigue. Patient said her white count was a little off per patient. Patient said that she did see hematology and she saw them in September. Patient is going have blood on Wednesday. Patient is tossing and turning patient said that she has soreness in her arms and legs. Patient denies being sick lately, patient said it started a month ago. Patient is still exercising, she said she is doing 4-5 times a week. Patient said her sleep, she is tossing and turning, her pain has a lot to do with her moving around. Patient does drink a few bottle a day. She does a lot of green and black tea. Patient bowels have been ok. Patient said she has pain when someone touches her. Patient is having night sweats occasionally, patient does get migraines, she gets them around her menstrual time. Patient said she changed to aleve body ache, she said that has been helping. Review of Systems   Constitutional: Positive for fatigue. Negative for chills. Musculoskeletal: Positive for myalgias. All other systems reviewed and are negative.       PHYSICAL EXAMINATION:  [ INSTRUCTIONS:  \"[x]\" Indicates a positive item  \"[]\" Indicates a negative item  -- DELETE ALL ITEMS NOT EXAMINED]  Vital Signs: (As obtained by patient/caregiver or practitioner observation)    Patient-Reported Vitals 2022   Patient-Reported Weight 140lb   Patient-Reported Height 62in         Constitutional: [x] Appears well-developed and well-nourished [x] No apparent distress      [] Abnormal-   Mental status  [x] Alert and awake  [x] Oriented to person/place/time [x]Able to follow commands      Eyes:  EOM    []  Normal  [] Abnormal-  Sclera  []  Normal  [] Abnormal -         Discharge []  None visible  [] Abnormal -    HENT:   [x] Normocephalic, atraumatic. [] Abnormal   [] Mouth/Throat: Mucous membranes are moist.     External Ears [x] Normal  [] Abnormal-     Neck: [] No visualized mass     Pulmonary/Chest: [x] Respiratory effort normal.  [x] No visualized signs of difficulty breathing or respiratory distress        [] Abnormal-      Musculoskeletal:   [x] Normal gait with no signs of ataxia         [] Normal range of motion of neck        [] Abnormal-       Neurological:        [x] No Facial Asymmetry (Cranial nerve 7 motor function) (limited exam to video visit)          [] No gaze palsy        [] Abnormal-         Skin:        [x] No significant exanthematous lesions or discoloration noted on facial skin         [] Abnormal-            Psychiatric:       [x] Normal Affect [x] No Hallucinations        [] Abnormal-     Other pertinent observable physical exam findings-     ASSESSMENT/PLAN:  1. Body aches  Patient feels that her all her muscles are weak. Will check blood work patient understands to check with hematology to see if this could be underlying cause patient understands we will send her to neurology if not. - CK; Future  - TSH with Reflex; Future  - Celiac Screen with Reflex; Future    2. Fatigue, unspecified type  Having extreme fatigue where she is having a hard time even opening doors. Patient will talked with her hematologist to see if this could be part of what is going on with her blood. If it is not we will refer patient to neurology patient agrees with plan. Also get blood work as well. - TSH with Reflex; Future  - Celiac Screen with Reflex; Future      No follow-ups on file. Meme Retana is a 40 y.o. female being evaluated by a Virtual Visit (video visit) encounter to address concerns as mentioned above. A caregiver was present when appropriate.  Due to this being a TeleHealth encounter (During BFDMR-08 public health emergency), evaluation of the following organ systems was limited: Vitals/Constitutional/EENT/Resp/CV/GI//MS/Neuro/Skin/Heme-Lymph-Imm. Pursuant to the emergency declaration under the [address] [place] [address], [address] [place] [address] and the [employer] Act, this Virtual Visit was conducted with patient's (and/or legal guardian's) consent, to reduce the patient's risk of exposure to COVID-19 and provide necessary medical care. The patient (and/or legal guardian) has also been advised to contact this office for worsening conditions or problems, and seek emergency medical treatment and/or call 911 if deemed necessary. Patient identification was verified at the start of the visit: Yes    Not billed for time. Services were provided through a video synchronous discussion virtually to substitute for in-person clinic visit. Patient and provider were located at their individual homes. --JONAH Tinsley - CNP on 2/14/2022 at 4:08 PM    An electronic signature was used to authenticate this note. This dictation was generated by voice recognition computer software. Although all attempts are made to edit the dictation for accuracy, there may be errors in the transcription that were not intended.

## 2022-02-14 NOTE — TELEPHONE ENCOUNTER
Received call from [facility] at [facility], caller not on line. Complaint: extreme fatigue    Market: [address] [place] [address] [place] Name: Clark Ocasio    Caller's telephone number verified as [phone]    Unsuccessful attempt to re-connect with caller via phone, left message for return call to office          Reason for Disposition   Message left on unidentified voice mail. Phone number verified.     Protocols used: NO CONTACT OR DUPLICATE CONTACT CALL-ADULT-[place]

## 2022-02-14 NOTE — TELEPHONE ENCOUNTER
Subjective: Caller states \"weakness and fatigue. I can hardly open a door or a bag of chips because it is getting so bad. I  had this last year and I was connected to a hematologist then and so I did call and make an appt with him again. I was started on folic acid and low dose of aspirin due to some levels being off and I started feeling better in Sept but within the last month I started feeling bad again. I feel like I was in a car accident and my body is sore all over. \"     Current Symptoms: weakness, fatigue, generalized muscle aches all over     Onset: 1 month ago; worsening    Associated Symptoms: reduced activity, reduced sleep due to aching all over    Pain Severity: 4/10; aching; constant    Temperature: denies     What has been tried: made an appt with hematologist     LMP: last week 4th-8th  Pregnant: No    Recommended disposition: to be seen now for generalized weakness, fatigue, and all over muscle aches. Pt advised that if no appts she can go to ED/UCC/Clinic to be seen. Care advice provided, patient verbalizes understanding; denies any other questions or concerns; instructed to call back for any new or worsening symptoms. Writer provided warm transfer to [place] Staff  at [facility] for appointment scheduling     Attention Provider: Thank you for allowing me to participate in the care of your patient. The patient was connected to triage in response to information provided to the ECC/PSC. Please do not respond through this encounter as the response is not directed to a shared pool.       Reason for Disposition   MODERATE weakness (i.e., interferes with work, school, normal activities) and cause unknown (Exceptions: weakness with acute minor illness, or weakness from poor fluid intake)    Protocols used: WEAKNESS (GENERALIZED) AND FATIGUE-ADULT-[place]

## 2022-02-16 DIAGNOSIS — R53.83 FATIGUE, UNSPECIFIED TYPE: ICD-10-CM

## 2022-02-16 DIAGNOSIS — R52 BODY ACHES: ICD-10-CM

## 2022-02-16 LAB
IGA: 217 MG/DL (ref 70–400)
TOTAL CK: 48 U/L (ref 26–192)
TSH REFLEX: 2.59 UIU/ML (ref 0.27–4.2)
VITAMIN B-12: 1118 PG/ML (ref 211–911)

## 2022-02-17 LAB — TISSUE TRANSGLUTAMINASE IGA: <0.5 U/ML (ref 0–14)

## 2022-10-06 ENCOUNTER — OFFICE VISIT (OUTPATIENT)
Dept: PRIMARY CARE CLINIC | Age: 47
End: 2022-10-06
Payer: COMMERCIAL

## 2022-10-06 VITALS
WEIGHT: 168.2 LBS | HEIGHT: 62 IN | BODY MASS INDEX: 30.95 KG/M2 | SYSTOLIC BLOOD PRESSURE: 130 MMHG | DIASTOLIC BLOOD PRESSURE: 84 MMHG

## 2022-10-06 DIAGNOSIS — K57.92 DIVERTICULITIS: ICD-10-CM

## 2022-10-06 DIAGNOSIS — Z01.818 PRE-OP EXAM: Primary | ICD-10-CM

## 2022-10-06 DIAGNOSIS — R10.32 LEFT LOWER QUADRANT ABDOMINAL PAIN: ICD-10-CM

## 2022-10-06 PROCEDURE — 99212 OFFICE O/P EST SF 10 MIN: CPT | Performed by: NURSE PRACTITIONER

## 2022-10-06 RX ORDER — CIPROFLOXACIN 500 MG/1
TABLET, FILM COATED ORAL
COMMUNITY
Start: 2022-09-20

## 2022-10-06 RX ORDER — CYANOCOBALAMIN (VITAMIN B-12) 500 MCG
1 TABLET ORAL DAILY
COMMUNITY

## 2022-10-06 RX ORDER — METRONIDAZOLE 500 MG/1
TABLET ORAL
COMMUNITY
Start: 2022-09-02

## 2022-10-06 SDOH — ECONOMIC STABILITY: FOOD INSECURITY: WITHIN THE PAST 12 MONTHS, THE FOOD YOU BOUGHT JUST DIDN'T LAST AND YOU DIDN'T HAVE MONEY TO GET MORE.: NEVER TRUE

## 2022-10-06 SDOH — ECONOMIC STABILITY: FOOD INSECURITY: WITHIN THE PAST 12 MONTHS, YOU WORRIED THAT YOUR FOOD WOULD RUN OUT BEFORE YOU GOT MONEY TO BUY MORE.: NEVER TRUE

## 2022-10-06 ASSESSMENT — PATIENT HEALTH QUESTIONNAIRE - PHQ9
SUM OF ALL RESPONSES TO PHQ QUESTIONS 1-9: 0
1. LITTLE INTEREST OR PLEASURE IN DOING THINGS: 0
SUM OF ALL RESPONSES TO PHQ QUESTIONS 1-9: 0
SUM OF ALL RESPONSES TO PHQ QUESTIONS 1-9: 0
2. FEELING DOWN, DEPRESSED OR HOPELESS: 0
SUM OF ALL RESPONSES TO PHQ QUESTIONS 1-9: 0
SUM OF ALL RESPONSES TO PHQ9 QUESTIONS 1 & 2: 0

## 2022-10-06 ASSESSMENT — ENCOUNTER SYMPTOMS
COUGH: 0
BLOOD IN STOOL: 0
CONSTIPATION: 0
WHEEZING: 0
NAUSEA: 1
DIARRHEA: 0
RECTAL PAIN: 0
SHORTNESS OF BREATH: 0
ABDOMINAL PAIN: 1

## 2022-10-06 ASSESSMENT — SOCIAL DETERMINANTS OF HEALTH (SDOH): HOW HARD IS IT FOR YOU TO PAY FOR THE VERY BASICS LIKE FOOD, HOUSING, MEDICAL CARE, AND HEATING?: NOT HARD AT ALL

## 2022-10-06 NOTE — PROGRESS NOTES
Preoperative Consultation      Adelina Liu  YOB: 1975  Date of Service: 10/6/2022    Vitals:    10/06/22 1042   BP: 130/84   Site: Left Upper Arm   Position: Sitting   Cuff Size: Medium Adult   Weight: 168 lb 3.2 oz (76.3 kg)   Height: 5' 2\" (1.575 m)     Wt Readings from Last 2 Encounters:   10/06/22 168 lb 3.2 oz (76.3 kg)   08/17/21 140 lb (63.5 kg)     BP Readings from Last 3 Encounters:   10/06/22 130/84   08/08/21 116/83   03/01/21 120/78        Allergies   Allergen Reactions    Penicillins Anaphylaxis, Shortness Of Breath and Swelling    Sulfa Antibiotics Swelling    Sumatriptan Nausea And Vomiting     Outpatient Medications Marked as Taking for the 10/6/22 encounter (Office Visit) with JONAH Lisa - CNP   Medication Sig Dispense Refill    ciprofloxacin (CIPRO) 500 MG tablet TAKE 1 TABLET BY MOUTH TWICE A DAY FOR 10 DAYS      metroNIDAZOLE (FLAGYL) 500 MG tablet TAKE 1 TABLET BY MOUTH 3 TIMES DAILY FOR 10 DAYS. Folic Acid 0.8 MG CAPS Take 1 capsule by mouth daily      aspirin-acetaminophen-caffeine (EXCEDRIN MIGRAINE) 250-250-65 MG per tablet Take 1 tablet by mouth every 6 hours as needed      Biotin 1 MG CAPS Take 1 mg by mouth daily      COLLAGEN PO Take 500 mg by mouth daily      ibuprofen (ADVIL;MOTRIN) 200 MG tablet Take 200 mg by mouth every 6 hours as needed         Chief Complaint   Patient presents with    Pre-op Exam     10/19/2022 - colon resection Jagdeep Whitley MD)         HPI:    This patient presents to the office today for a preoperative consultation at the request of surgeon, Dr. Kristen Phillips plans on performing large bowel resection on October 19 at [facility].      Planned anesthesia: General  Known anesthesia problems: None   Bleeding risk:No recent or remote history of abnormal bleeding  Personal or FH of DVT/PE: No steroid use: no  Exercise tolerance: patient is able to walk 3 city blocks without getting shortness of breath    Patient objection to receiving blood products: No    There is no problem list on file for this patient. Past Medical History:   Diagnosis Date    Diverticulitis     Migraines      Past Surgical History:   Procedure Laterality Date    SPLENECTOMY      TONSILLECTOMY       Family History   Adopted: Yes     Social History     Socioeconomic History    Marital status:      Spouse name: Not on file    Number of children: Not on file    Years of education: Not on file    Highest education level: Not on file   Occupational History    Not on file   Tobacco Use    Smoking status: Never    Smokeless tobacco: Never   Vaping Use    Vaping Use: Not on file   Substance and Sexual Activity    Alcohol use: Yes     Comment: socially     Drug use: Never    Sexual activity: Yes     Partners: Male     Comment:     Other Topics Concern    Not on file   Social History Narrative    Not on file     Social Determinants of Health     Financial Resource Strain: Low Risk     Difficulty of Paying Living Expenses: Not hard at all   Food Insecurity: No Food Insecurity    Worried About Running Out of Food in the Last Year: Never true    Ran Out of Food in the Last Year: Never true   Transportation Needs: Not on file   Physical Activity: Not on file   Stress: Not on file   Social Connections: Not on file   Intimate Partner Violence: Not on file   Housing Stability: Not on file       Review of Systems   Constitutional:  Negative for chills, fatigue and fever. Respiratory:  Negative for cough, shortness of breath and wheezing. Gastrointestinal:  Positive for abdominal pain (left lower) and nausea. Negative for blood in stool, constipation, diarrhea and rectal pain. All other systems reviewed and are negative. Physical Exam  Vitals reviewed. Constitutional:       Appearance: Normal appearance. HENT:      Head: Normocephalic.       Right Ear: Tympanic membrane, ear canal and external ear normal.      Left Ear: Tympanic membrane, ear canal and external ear normal.      Nose: Nose normal.      Mouth/Throat:      Lips: Pink. Mouth: Mucous membranes are moist.      Pharynx: Oropharynx is clear. Uvula midline. Comments: Negative gag reflex   Eyes:      Extraocular Movements: Extraocular movements intact. Conjunctiva/sclera: Conjunctivae normal.      Pupils: Pupils are equal, round, and reactive to light. Cardiovascular:      Rate and Rhythm: Normal rate and regular rhythm. Pulses: Normal pulses. Heart sounds: Normal heart sounds. Pulmonary:      Effort: Pulmonary effort is normal.      Breath sounds: Normal breath sounds. Abdominal:      General: Abdomen is flat. Palpations: Abdomen is soft. Tenderness: There is abdominal tenderness in the left lower quadrant. Musculoskeletal:      Cervical back: Normal range of motion. Skin:     General: Skin is warm. Capillary Refill: Capillary refill takes less than 2 seconds. Neurological:      General: No focal deficit present. Mental Status: She is alert and oriented to person, place, and time. Psychiatric:         Mood and Affect: Mood normal.         Behavior: Behavior normal.         Thought Content: Thought content normal.         Judgment: Judgment normal.        Assessment/Plan:   1. Pre-op exam  Patient is intermediate risk for intermediate risk procedure     2. Left lower quadrant abdominal pain  Here for pre op     3. Diverticulitis  Here for pre op     1. Preoperative work up as follows: none  2. Change in medication regimen before surgery: Discontinue NSAIDs 7 days before surgery  3. Prophylaxis for cardiac events with perioperative beta-blockers: Not indicated  4. Deep vein thrombosis prophylaxis: regimen to be chosen by surgical team  5.  No contraindications to planned surgery    Electronically signed by JONAH Parker CNP on 10/6/22 at 10:50 AM EDT     This dictation was generated by voice recognition computer software. Although all attempts are made to edit the dictation for accuracy, there may be errors in the transcription that were not intended.

## 2023-03-10 ENCOUNTER — HOSPITAL ENCOUNTER (EMERGENCY)
Age: 48
Discharge: HOME | End: 2023-03-10
Attending: EMERGENCY MEDICINE
Payer: COMMERCIAL

## 2023-03-10 ENCOUNTER — APPOINTMENT (OUTPATIENT)
Dept: GENERAL RADIOLOGY | Age: 48
End: 2023-03-10
Payer: COMMERCIAL

## 2023-03-10 VITALS
TEMPERATURE: 98.4 F | DIASTOLIC BLOOD PRESSURE: 88 MMHG | HEIGHT: 63 IN | BODY MASS INDEX: 29.49 KG/M2 | WEIGHT: 166.45 LBS | SYSTOLIC BLOOD PRESSURE: 128 MMHG | HEART RATE: 88 BPM | RESPIRATION RATE: 16 BRPM | OXYGEN SATURATION: 99 %

## 2023-03-10 DIAGNOSIS — S93.402A SPRAIN OF LEFT ANKLE, UNSPECIFIED LIGAMENT, INITIAL ENCOUNTER: Primary | ICD-10-CM

## 2023-03-10 PROCEDURE — 73630 X-RAY EXAM OF FOOT: CPT

## 2023-03-10 PROCEDURE — 99283 EMERGENCY DEPT VISIT LOW MDM: CPT

## 2023-03-10 PROCEDURE — 73610 X-RAY EXAM OF ANKLE: CPT

## 2023-03-10 PROCEDURE — 6370000000 HC RX 637 (ALT 250 FOR IP): Performed by: EMERGENCY MEDICINE

## 2023-03-10 RX ORDER — ACETAMINOPHEN 500 MG
1000 TABLET ORAL ONCE
Status: COMPLETED | OUTPATIENT
Start: 2023-03-10 | End: 2023-03-10

## 2023-03-10 RX ADMIN — ACETAMINOPHEN 1000 MG: 500 TABLET ORAL at 10:01

## 2023-03-10 ASSESSMENT — PAIN - FUNCTIONAL ASSESSMENT: PAIN_FUNCTIONAL_ASSESSMENT: 0-10

## 2023-03-10 ASSESSMENT — PAIN SCALES - GENERAL
PAINLEVEL_OUTOF10: 2
PAINLEVEL_OUTOF10: 3
PAINLEVEL_OUTOF10: 2

## 2023-03-10 ASSESSMENT — PAIN DESCRIPTION - LOCATION: LOCATION: ANKLE

## 2023-03-10 ASSESSMENT — PAIN DESCRIPTION - DESCRIPTORS: DESCRIPTORS: ACHING

## 2023-03-10 ASSESSMENT — PAIN DESCRIPTION - ORIENTATION: ORIENTATION: LEFT

## 2023-03-10 NOTE — ED PROVIDER NOTES
[facility]    CHIEF COMPLAINT  Ankle Injury (Pt. Twisted her left ankle last night, swelling noted left lateral mallelous)       HISTORY OF PRESENT ILLNESS  Nirav Mccarthy is a 56 y.o. female who presents to the ED with left ankle pain. Symptoms started yesterday. Rolled ankle while walking. Tried a leftover oxycodone from a previous injury of the contralateral ankle. Denies head injury, LOC, neck pain, weakness, numbness. No other complaints, modifying factors or associated symptoms. I have reviewed the following from the nursing documentation:    Past Medical History:   Diagnosis Date    Diverticulitis     Migraines      Past Surgical History:   Procedure Laterality Date    COLON SURGERY      SPLENECTOMY      TONSILLECTOMY       Family History   Adopted: Yes     Social History     Socioeconomic History    Marital status:      Spouse name: Not on file    Number of children: Not on file    Years of education: Not on file    Highest education level: Not on file   Occupational History    Not on file   Tobacco Use    Smoking status: Never    Smokeless tobacco: Never   Vaping Use    Vaping Use: Not on file   Substance and Sexual Activity    Alcohol use: Yes     Comment: socially     Drug use: Never    Sexual activity: Yes     Partners: Male     Comment:     Other Topics Concern    Not on file   Social History Narrative    Not on file     Social Determinants of Health     Financial Resource Strain: Low Risk     Difficulty of Paying Living Expenses: Not hard at all   Food Insecurity: No Food Insecurity    Worried About Running Out of Food in the Last Year: Never true    Ran Out of Food in the Last Year: Never true   Transportation Needs: Not on file   Physical Activity: Not on file   Stress: Not on file   Social Connections: Not on file   Intimate Partner Violence: Not on file   Housing Stability: Not on file     No current facility-administered medications for this encounter. Current Outpatient Medications   Medication Sig Dispense Refill    Folic Acid 0.8 MG CAPS Take 1 capsule by mouth daily      Biotin 1 MG CAPS Take 1 mg by mouth daily      COLLAGEN PO Take 500 mg by mouth daily       Allergies   Allergen Reactions    Penicillins Anaphylaxis, Shortness Of Breath and Swelling    Sulfa Antibiotics Swelling         PHYSICAL EXAM  ED Triage Vitals [03/10/23 0949]   BP Temp Temp Source Heart Rate Resp SpO2 Height Weight   (!) 159/96 98.4 °F (36.9 °C) Oral 84 16 98 % 5' 3\" (1.6 m) 166 lb 7.2 oz (75.5 kg)     General appearance: Awake and alert. Cooperative. No acute distress. HEENT: Normocephalic. Atraumatic. EOMI. Mucous membranes are moist.  Heart/Chest: RRR. No murmurs. Lungs: Respirations unlabored. CTAB. Good air exchange. Speaking comfortably in full sentences  Musculoskeletal: LLE: Tenderness to palpation at the posterior aspect of the lateral malleolus. Mild TTP at base of 5th metatarsal. 2+ DP/PT pulses. Wiggles toes. SILT. Skin: Warm and dry. No acute rashes. Psychiatric: Mood/affect: normal      RADIOLOGY  I have reviewed all radiographic studies for this visit. XR ANKLE LEFT (MIN 3 VIEWS)   Final Result   No acute bony or joint abnormality         XR FOOT LEFT (MIN 3 VIEWS)   Final Result   No acute bony or joint abnormality              ED COURSE/MDM  Patient seen and evaluated. Old records reviewed. Labs and imaging reviewed and results discussed with patient/family to extent possible. 56 y.o. female presents with left ankle pain. The limb is neurovascularly intact. XR to my read reveals no acute fracture or dislocation. Confirmed by radiologist.     Plan is walking boot, rest, ice, elevate, compress, NSAIDs. Follow up with Orthopedics in 1 week as needed. I considered prescription pain medication however pain well-controlled with NSAIDs therefore deferred in favor of OTC meds. Usual strict return precautions for new or worsening symptoms communicated. During the patient's ED course, the patient was given:  Medications   acetaminophen (TYLENOL) tablet 1,000 mg (1,000 mg Oral Given 3/10/23 1001)        All questions were answered and the patient/family expressed understanding and agreement with the plan. PROCEDURES  None    CLINICAL IMPRESSION  1. Sprain of left ankle, unspecified ligament, initial encounter        DISPOSITION  Decision To Discharge 03/10/2023 10:40:34 AM     Clifton Neri MD    Note: This chart was created using voice recognition dictation software. Efforts were made by me to ensure accuracy, however some errors may be present due to limitations of this technology and occasionally words are not transcribed correctly.        Clifton Neri MD  03/10/23 1147

## 2023-03-10 NOTE — DISCHARGE INSTRUCTIONS
Take ibuprofen, 600 mg four times per day every day with food for the next 3-5 days, then as needed and directed on the bottle for continued pain and swelling. Rest, ice, and elevate the foot as much as possible for the next 48 hours, to minimize swelling. You should wear the Ace wrap or air cast for support and comfort. Ensure that the wrap is snug, but not so tight that it causes any numbness, tingling, or color changes in your toes. You should use the crutches and keep your weight off the injured foot completely for the next day or two, then gradually begin increasing weightbearing as discussed, with a goal to getting off of the crutches within 1-2 weeks. Please call your doctor's office, or return to the emergency department, if you have increasing pain, increasing swelling, or other concerning symptoms. Rest:  Rest and protect the injured area. If it hurts to bear weight on the injury, use crutches, if it hurts to move the area immobilize it with a splint. Ice:  Apply ice or a bag of frozen vegetables to the injury. Ice always goes in a plastic bag with a cloth wrapped around it, never directly to the skin. The cold will reduce swelling and pain at the injured site. This step should be done as soon as possible. Apply the cold object to the area for 20 minutes 5-6 times a day for the first 48 hours. Do not use heat in the first 48 hours after an injury. Compression:  Compress the injured site by applying an Ace bandage. This will decrease swelling of the injured region. Although the wrap should be snug, make sure it is not too tight as this can cause numbness, tingling, or increased pain. Elevation:  Elevate the foot/ankle above the level of your heart (a few pillows) when at rest to reduce pain and swelling.

## 2023-05-24 ENCOUNTER — TELEPHONE (OUTPATIENT)
Dept: PRIMARY CARE CLINIC | Age: 48
End: 2023-05-24

## 2023-05-24 NOTE — TELEPHONE ENCOUNTER
Patient called into office stating she had some bloodwork done recently at [facility] in [facility]. Patient stated her TSH levels were .05, [facility] advised patient to call into office to notify provider. Please advise test results are not in chart.      Last appt: 10/06/2023  Next appt: None

## 2023-05-24 NOTE — TELEPHONE ENCOUNTER
Found in care everywhere with carson -          5/18/2023 9:27 AM    FSH 8.12   ---T4, FREE ---------------   Free Thyroxine (FT4) 1.22   ---TSH ---------------   TSH 0.050

## 2023-10-13 ENCOUNTER — NUTRITION (OUTPATIENT)
Dept: PRIMARY CARE CLINIC | Age: 48
End: 2023-10-13
Payer: COMMERCIAL

## 2023-10-13 VITALS
TEMPERATURE: 98.1 F | WEIGHT: 168 LBS | SYSTOLIC BLOOD PRESSURE: 132 MMHG | HEIGHT: 63 IN | OXYGEN SATURATION: 96 % | HEART RATE: 85 BPM | RESPIRATION RATE: 16 BRPM | BODY MASS INDEX: 29.77 KG/M2 | DIASTOLIC BLOOD PRESSURE: 108 MMHG

## 2023-10-13 DIAGNOSIS — R03.0 ELEVATED BLOOD PRESSURE READING: Primary | ICD-10-CM

## 2023-10-13 DIAGNOSIS — E66.3 OVERWEIGHT: ICD-10-CM

## 2023-10-13 DIAGNOSIS — Z12.39 SCREENING BREAST EXAMINATION: ICD-10-CM

## 2023-10-13 PROCEDURE — 99213 OFFICE O/P EST LOW 20 MIN: CPT | Performed by: NURSE PRACTITIONER

## 2023-10-13 SDOH — ECONOMIC STABILITY: INCOME INSECURITY: HOW HARD IS IT FOR YOU TO PAY FOR THE VERY BASICS LIKE FOOD, HOUSING, MEDICAL CARE, AND HEATING?: NOT VERY HARD

## 2023-10-13 SDOH — ECONOMIC STABILITY: FOOD INSECURITY: WITHIN THE PAST 12 MONTHS, THE FOOD YOU BOUGHT JUST DIDN'T LAST AND YOU DIDN'T HAVE MONEY TO GET MORE.: NEVER TRUE

## 2023-10-13 SDOH — ECONOMIC STABILITY: HOUSING INSECURITY
IN THE LAST 12 MONTHS, WAS THERE A TIME WHEN YOU DID NOT HAVE A STEADY PLACE TO SLEEP OR SLEPT IN A SHELTER (INCLUDING NOW)?: NO

## 2023-10-13 SDOH — ECONOMIC STABILITY: FOOD INSECURITY: WITHIN THE PAST 12 MONTHS, YOU WORRIED THAT YOUR FOOD WOULD RUN OUT BEFORE YOU GOT MONEY TO BUY MORE.: NEVER TRUE

## 2023-10-13 ASSESSMENT — PATIENT HEALTH QUESTIONNAIRE - PHQ9
SUM OF ALL RESPONSES TO PHQ QUESTIONS 1-9: 0
SUM OF ALL RESPONSES TO PHQ QUESTIONS 1-9: 0
SUM OF ALL RESPONSES TO PHQ9 QUESTIONS 1 & 2: 0
SUM OF ALL RESPONSES TO PHQ QUESTIONS 1-9: 0
SUM OF ALL RESPONSES TO PHQ QUESTIONS 1-9: 0
1. LITTLE INTEREST OR PLEASURE IN DOING THINGS: 0
2. FEELING DOWN, DEPRESSED OR HOPELESS: 0

## 2023-10-13 ASSESSMENT — ENCOUNTER SYMPTOMS
BLOOD IN STOOL: 0
SHORTNESS OF BREATH: 0
COUGH: 0
WHEEZING: 0

## 2023-10-13 NOTE — PROGRESS NOTES
PROGRESS NOTE  Date of Service:  10/13/2023  Address: [address] [place] [address] CARE  [address] [place], [place] [address]neil HwangHawkins[address] [address]  Dept: [phone]  Loc: [phone]    Subjective:      Patient ID: [id number]  Alejandro Aranda is a 53 y.o. female    HPI: patient is here for nutrition and blood pressure. Patient said September was a crazy time at work. Patient said she does not feel run down. Patient said she has been doing more protein in the morning, ensure or boost. Patient does feel the high protein is helping with appetite. Patient said in September she has not been able to workout at all, she said that she has gained a little since then. Patient said getting back into the routine. Patient said the last week she is feeling good, not crazy and foggy. Patient said she has been trying to eat every 2 hrs, patient said she will do cottage cheese twice, chicken noodle soup. Patient said when she was working out she was lifting. Patient said dinner has not been as good. Patient was doing salmon and rice. Patient said she started having periods again, she said they are heavy. Review of Systems   Constitutional:  Negative for chills, fatigue and fever. Respiratory:  Negative for cough, shortness of breath and wheezing. Cardiovascular:  Negative for chest pain, palpitations and leg swelling. Gastrointestinal:  Negative for blood in stool. All other systems reviewed and are negative. Objective:   Physical Exam  Vitals reviewed. Constitutional:       Appearance: Normal appearance. Cardiovascular:      Rate and Rhythm: Normal rate and regular rhythm. Pulses: Normal pulses. Heart sounds: Normal heart sounds. Pulmonary:      Effort: Pulmonary effort is normal.      Breath sounds: Normal breath sounds. Skin:     Capillary Refill: Capillary refill takes less than 2 seconds. Neurological:      General: No focal deficit present.       Mental

## 2023-11-09 ENCOUNTER — HOSPITAL ENCOUNTER (OUTPATIENT)
Dept: WOMENS IMAGING | Age: 48
Discharge: HOME | End: 2023-11-09
Payer: COMMERCIAL

## 2023-11-09 VITALS — HEIGHT: 63 IN | WEIGHT: 170 LBS | BODY MASS INDEX: 30.12 KG/M2

## 2023-11-09 DIAGNOSIS — Z12.39 SCREENING BREAST EXAMINATION: ICD-10-CM

## 2023-11-09 PROCEDURE — 77063 BREAST TOMOSYNTHESIS BI: CPT

## 2025-02-03 ENCOUNTER — HOSPITAL ENCOUNTER (OUTPATIENT)
Dept: CT IMAGING | Age: 50
Discharge: HOME | End: 2025-02-03
Attending: INTERNAL MEDICINE
Payer: COMMERCIAL

## 2025-02-03 DIAGNOSIS — R10.32 LEFT LOWER QUADRANT ABDOMINAL PAIN: ICD-10-CM

## 2025-02-03 DIAGNOSIS — D58.0 HEREDITARY SPHEROCYTOSIS (HCC): ICD-10-CM

## 2025-02-03 PROCEDURE — 74177 CT ABD & PELVIS W/CONTRAST: CPT

## 2025-02-03 PROCEDURE — 6360000004 HC RX CONTRAST MEDICATION: Performed by: INTERNAL MEDICINE

## 2025-02-03 RX ORDER — IOPAMIDOL 755 MG/ML
75 INJECTION, SOLUTION INTRAVASCULAR
Status: COMPLETED | OUTPATIENT
Start: 2025-02-03 | End: 2025-02-03

## 2025-02-03 RX ADMIN — IOPAMIDOL 75 ML: 755 INJECTION, SOLUTION INTRAVENOUS at 13:35
